# Patient Record
Sex: FEMALE | Race: WHITE | NOT HISPANIC OR LATINO | URBAN - METROPOLITAN AREA
[De-identification: names, ages, dates, MRNs, and addresses within clinical notes are randomized per-mention and may not be internally consistent; named-entity substitution may affect disease eponyms.]

---

## 2018-05-30 ENCOUNTER — OUTPATIENT (OUTPATIENT)
Dept: OUTPATIENT SERVICES | Facility: HOSPITAL | Age: 38
LOS: 1 days | End: 2018-05-30

## 2018-05-30 DIAGNOSIS — Z00.00 ENCOUNTER FOR GENERAL ADULT MEDICAL EXAMINATION WITHOUT ABNORMAL FINDINGS: ICD-10-CM

## 2024-03-05 ENCOUNTER — APPOINTMENT (OUTPATIENT)
Dept: OTOLARYNGOLOGY | Facility: CLINIC | Age: 44
End: 2024-03-05
Payer: COMMERCIAL

## 2024-03-05 DIAGNOSIS — Z87.09 PERSONAL HISTORY OF OTHER DISEASES OF THE RESPIRATORY SYSTEM: ICD-10-CM

## 2024-03-05 DIAGNOSIS — Z78.9 OTHER SPECIFIED HEALTH STATUS: ICD-10-CM

## 2024-03-05 DIAGNOSIS — Z86.39 PERSONAL HISTORY OF OTHER ENDOCRINE, NUTRITIONAL AND METABOLIC DISEASE: ICD-10-CM

## 2024-03-05 DIAGNOSIS — Z85.3 PERSONAL HISTORY OF MALIGNANT NEOPLASM OF BREAST: ICD-10-CM

## 2024-03-05 DIAGNOSIS — Z82.49 FAMILY HISTORY OF ISCHEMIC HEART DISEASE AND OTHER DISEASES OF THE CIRCULATORY SYSTEM: ICD-10-CM

## 2024-03-05 DIAGNOSIS — Z80.3 FAMILY HISTORY OF MALIGNANT NEOPLASM OF BREAST: ICD-10-CM

## 2024-03-05 PROBLEM — Z00.00 ENCOUNTER FOR PREVENTIVE HEALTH EXAMINATION: Status: ACTIVE | Noted: 2024-03-05

## 2024-03-05 PROCEDURE — 31231 NASAL ENDOSCOPY DX: CPT

## 2024-03-05 PROCEDURE — 99203 OFFICE O/P NEW LOW 30 MIN: CPT | Mod: 25

## 2024-03-05 RX ORDER — LIOTHYRONINE SODIUM 5 UG/1
TABLET ORAL
Refills: 0 | Status: ACTIVE | COMMUNITY

## 2024-03-05 RX ORDER — AMOXICILLIN AND CLAVULANATE POTASSIUM 562.5; 437.5; 62.5 MG/1; MG/1; MG/1
TABLET, FILM COATED, EXTENDED RELEASE ORAL
Refills: 0 | Status: ACTIVE | COMMUNITY

## 2024-03-05 RX ORDER — LEVOTHYROXINE SODIUM 137 UG/1
TABLET ORAL
Refills: 0 | Status: ACTIVE | COMMUNITY

## 2024-03-05 RX ORDER — PREDNISONE 10 MG/1
10 TABLET ORAL
Qty: 12 | Refills: 0 | Status: ACTIVE | COMMUNITY
Start: 2024-03-05 | End: 1900-01-01

## 2024-03-05 NOTE — ASSESSMENT
[FreeTextEntry1] : OSCAR FRIAS has CRS with severe congestion. I started her on prednisone 30mg taper. ,tw  She will see Dr. Tyson Mantilla next week.

## 2024-03-05 NOTE — HISTORY OF PRESENT ILLNESS
[de-identified] : OSCAR FRIAS  is a 44 year woman with a history of Usher type II. She ahs visual loss and uses hearing aids. She has long history of sinus problems. She had septoplasty with Dr. Curry in 1999. After COVID in 2022 her sinus symptoms became severe mostly with nasal obstruction. She has had 3 procedures with Dr. Hayde Beard. 2 sinus procedures and one Vivier in office. She gets severe nasal congestion and facial pressure. She recently completed Augmentin for acute infection.  Recent CT shows pan sinusitis.

## 2024-03-05 NOTE — PHYSICAL EXAM
[TextEntry] : PHYSICAL EXAM  General: The patient was alert and oriented and in no distress. Voice was normal.  Neurologic: Cranial Nerves II-XII intact PERRLA There was no significant nystagmus or disconjugate gaze noted.  EOM's: Normal  Face: The patient had no facial asymmetry or mass. The skin was unremarkable.  Ears: External ears were normal without deformity. Ear canals were normal. Tympanic membranes were intact and normal. No perforation or effusion  Nose:  The external nose had no significant deformity.  There was no facial tenderness.  On anterior rhinoscopy, the nasal mucosa was normal.  The anterior septum was normal. There were no visualized polyps purulence  or masses.  Oral cavity: The oral mucosa was normal. The oral and base of tongue were clear and without mass. The gingival and buccal mucosa were moist and without lesions. The palate moved well. There was no cleft palate. There appeared to be good salivary flow.   There was no pus, erythema or mass in the oral cavity/oropharynx.  Neck:  The neck was symmetrical. The parotid and submandibular glands were normal without masses. The trachea was midline and there was no unusual crepitus. Thyroid was smooth and nontender and no masses were palpated. Cervical adenopathy- none.  Procedure: Fiberoptic Nasal Endoscopy  Diagnosis: Chronic sinusitis; s/p sinus surgery Examiner: Dr. Jose David Mccauley Anesthesia: Topical Lidocaine 2% and oxymetazoline  Procedure: The fiberoptic endoscope was introduced into the right nostril and passed along the floor of the nose and then  along the middle meatus. The same procedure was carried out  on the left side.  Findings:  SEPTUM:    RIGHT:  s/p sinus surgery Middle Turbinate: normal Frontal Sinus/Recess:clear Ethmoid Sinus cavity:clear Maxillary Sinus antrostomy:very small with discharge Interior of maxillary of sinus:normal Spheno ethmoidal recess:clear Inferior turbinate normal  LEFT: s/p surgery Middle Turbinate: normal Frontal Sinus/Recess:clear Ethmoid Sinus cavity:swollen Maxillary Sinus antrostomy:not seen Interior of maxillary of sinus:normal Spheno ethmoidal recess:clear Inferior turbinate normal  I cultured  left middle meatus  I suctioned thick mucin from left side

## 2024-03-12 ENCOUNTER — APPOINTMENT (OUTPATIENT)
Dept: OTOLARYNGOLOGY | Facility: CLINIC | Age: 44
End: 2024-03-12
Payer: COMMERCIAL

## 2024-03-12 PROCEDURE — 99214 OFFICE O/P EST MOD 30 MIN: CPT | Mod: 25

## 2024-03-12 PROCEDURE — 31231 NASAL ENDOSCOPY DX: CPT

## 2024-03-12 RX ORDER — MOMETASONE FUROATE 100 %
POWDER (GRAM) MISCELLANEOUS
Qty: 360 | Refills: 3 | Status: ACTIVE | COMMUNITY
Start: 2024-03-12 | End: 1900-01-01

## 2024-03-13 NOTE — HISTORY OF PRESENT ILLNESS
[de-identified] : CC: Usher syndrome   HISTORY OF PRESENT ILLNESS: Eduardo Coppola is a 43 y/o female who presents today with Usher syndrome Patient is referred by Dr. Mccauley, an Otolaryngologist, due to the complexity of the case has had at least 2 sinonasal procedures, most recently by Dr. 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 only line of sight vision  she had a CT sinus at [facility] in 2/2024 showing pansinusitis, right DNS, undissected anterior ethmoid cells, narrow frontals  Environmental Allergies: tested, negative Immunotherapy:no Smoker:no Asthma: no ASA sensitivity: no History of Autoimmune Disease: yes with Usher History of Immune Deficiency: no   Key Elements at least 4 described: Location: bilateral Severity: severe Quality: hyposmia Timing: constant Duration: years Modifying Factors: pred/abx Associated signs and symptoms: see above   REVIEW OF SYSTEMS: General ROS: negative for - chills, fatigue or fever Psychological ROS: negative for - anxiety or depression Ophthalmic ROS: negative for - blurry vision, decreased vision or double vision ENT ROS: negative except as noted from HPI Allergy and Immunology ROS: negative except as noted from HPI Hematological and Lymphatic ROS: negative for - bleeding problems Endocrine ROS: negative for - malaise/lethargy Respiratory ROS: negative for - stridor Cardiovascular ROS: negative for - chest pain Gastrointestinal ROS: negative for - appetite loss or nausea/vomiting Genitourinary ROS: negative for - incontinence Musculoskeletal ROS: negative for - gait disturbance Neurological ROS: negative for - behavioral changes Dermatological ROS: negative for - nail changes   Physical Exam:   GENERAL APPEARANCE: Well-developed and No Acute Distress. COMMUNICATION: Able to Communicate. Strong Voice.   HEAD AND FACE Eyes: Testing of ocular motility including primary gaze alignment normal. Inspection and Appearance: No evidence of lesions or masses Palpation: Palpation of the face reveals no sinus tenderness Salivary Glands: Symmetric without masses Facial Strength: Symmetric without evidence of facial paralysis   EAR, NOSE, MOUTH, and THROAT: Ear Canals and Tympanic Membranes, Bilateral: No evidence of inflammation or lesions. Thresholds: Clinical speech reception thresholds normal. External, Nose and Auricle: No lesions or masses. Nasal, Mucosa, Septum, and Turbinates: See endoscopy.   NECK: Evaluation: No evidence of masses or crepitus. The neck is symmetric and the trachea is in the midline position. Thyroid: No evidence of enlargement, tenderness or mass. Neck Lymph Nodes: WNL. Respiratory: Inspection of the chest including symmetry, expansion and/or assessment of respiratory effort normal. Cardiovascular: Evaluation of peripheral vascular system by observation and palpation of capillary refill, normal. Neurological/Psychiatric: Alert, Oriented, Mood, and Affect Normal.   IMAGING:   PROCEDURE: Nasal endoscopy ([id number])   SURGEON: Tyson Mantilla MD   Prior to the procedure, I had a discussion with the patient regarding the risks, benefits, and alternatives of the procedure and a verbal consent was obtained.   After obtaining adequate decongestion of the nasal mucosa with topical Epinephrine and adequate anesthesia with topical Lidocaine nasal spray, the nasal endoscope was used to examine the nasal passages and paranasal sinuses. The endoscope was passed along the floor of the nose bilaterally to evaluate the inferior meatus, floor of the nose, inferior turbinate, and nasopharynx. The scope was then passed superiorly to evaluate the area of the sphenoethmoidal recess, superior turbinate and superior meatus. As the scope was withdrawn anteriorly, the middle turbinate and middle meatus were carefully inspected. The endoscope was withdrawn and the patient tolerated the procedure well. No complications were encountered.   INSTRUMENTS: rigid 45   EXAM FINDINGS: right septal deformity. severe bilateral edema. right maxillary antrostomy is almost completely scarred off. left side has copious thick mucus suctioned out. max is closed.   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

## 2024-03-18 LAB — EAR NOSE AND THROAT CULTURE: NORMAL

## 2024-03-19 RX ORDER — PREDNISONE 10 MG/1
10 TABLET ORAL
Qty: 60 | Refills: 0 | Status: ACTIVE | COMMUNITY
Start: 2024-03-18 | End: 1900-01-01

## 2024-03-19 RX ORDER — OXYCODONE AND ACETAMINOPHEN 5; 325 MG/1; MG/1
5-325 TABLET ORAL
Qty: 30 | Refills: 0 | Status: ACTIVE | COMMUNITY
Start: 2024-03-18 | End: 1900-01-01

## 2024-03-19 RX ORDER — ELASTIC BANDAGE 1"X2.2YD
2300-700 BANDAGE TOPICAL
Qty: 1 | Refills: 0 | Status: DISCONTINUED | COMMUNITY
Start: 2024-03-18 | End: 2024-03-19

## 2024-03-19 RX ORDER — CLINDAMYCIN HYDROCHLORIDE 150 MG/1
150 CAPSULE ORAL
Qty: 42 | Refills: 0 | Status: ACTIVE | COMMUNITY
Start: 2024-03-18 | End: 1900-01-01

## 2024-03-22 VITALS
DIASTOLIC BLOOD PRESSURE: 59 MMHG | TEMPERATURE: 97 F | OXYGEN SATURATION: 97 % | RESPIRATION RATE: 16 BRPM | HEART RATE: 73 BPM | WEIGHT: 141.54 LBS | HEIGHT: 66 IN | SYSTOLIC BLOOD PRESSURE: 91 MMHG

## 2024-03-22 NOTE — ASU PATIENT PROFILE, ADULT - NSICDXPASTMEDICALHX_GEN_ALL_CORE_FT
PAST MEDICAL HISTORY:  Usher syndrome Legally BLIND     PAST MEDICAL HISTORY:  Anxiety     GERD (gastroesophageal reflux disease)     H/O hearing loss     History of retinitis pigmentosa     Usher syndrome Legally BLIND     PAST MEDICAL HISTORY:  Anxiety     Deviated nasal septum     GERD (gastroesophageal reflux disease)     H/O hearing loss     History of retinitis pigmentosa     Usher syndrome Legally BLIND

## 2024-03-22 NOTE — ASU PATIENT PROFILE, ADULT - VISION (WITH CORRECTIVE LENSES IF THE PATIENT USUALLY WEARS THEM):
Legally BLIND/Severely impaired: cannot locate objects without hearing or touching them or patient nonresponsive.

## 2024-03-22 NOTE — ASU PATIENT PROFILE, ADULT - NSICDXPASTSURGICALHX_GEN_ALL_CORE_FT
PAST SURGICAL HISTORY:  Breast CA     H/O right mastectomy reconstruction    H/O sinus surgery x 2    H/O thyroidectomy     H/O total hip arthroplasty, left     History of tonsillectomy and adenoidectomy

## 2024-03-22 NOTE — ASU PATIENT PROFILE, ADULT - ABILITY TO HEAR (WITH HEARING AID OR HEARING APPLIANCE IF NORMALLY USED):
Adequate: hears normal conversation without difficulty BILAT HEARING AIDS/Adequate: hears normal conversation without difficulty

## 2024-03-22 NOTE — ASU PATIENT PROFILE, ADULT - FALL HARM RISK - RISK INTERVENTIONS

## 2024-03-24 ENCOUNTER — TRANSCRIPTION ENCOUNTER (OUTPATIENT)
Age: 44
End: 2024-03-24

## 2024-03-25 ENCOUNTER — TRANSCRIPTION ENCOUNTER (OUTPATIENT)
Age: 44
End: 2024-03-25

## 2024-03-25 ENCOUNTER — APPOINTMENT (OUTPATIENT)
Dept: OTOLARYNGOLOGY | Facility: HOSPITAL | Age: 44
End: 2024-03-25

## 2024-03-25 ENCOUNTER — OUTPATIENT (OUTPATIENT)
Dept: OUTPATIENT SERVICES | Facility: HOSPITAL | Age: 44
LOS: 1 days | Discharge: HOME | End: 2024-03-25
Payer: COMMERCIAL

## 2024-03-25 ENCOUNTER — RESULT REVIEW (OUTPATIENT)
Age: 44
End: 2024-03-25

## 2024-03-25 VITALS
DIASTOLIC BLOOD PRESSURE: 65 MMHG | TEMPERATURE: 99 F | HEART RATE: 69 BPM | RESPIRATION RATE: 14 BRPM | SYSTOLIC BLOOD PRESSURE: 112 MMHG | OXYGEN SATURATION: 100 %

## 2024-03-25 DIAGNOSIS — Z90.11 ACQUIRED ABSENCE OF RIGHT BREAST AND NIPPLE: Chronic | ICD-10-CM

## 2024-03-25 DIAGNOSIS — Z98.890 OTHER SPECIFIED POSTPROCEDURAL STATES: Chronic | ICD-10-CM

## 2024-03-25 DIAGNOSIS — C50.919 MALIGNANT NEOPLASM OF UNSPECIFIED SITE OF UNSPECIFIED FEMALE BREAST: Chronic | ICD-10-CM

## 2024-03-25 DIAGNOSIS — Z90.89 ACQUIRED ABSENCE OF OTHER ORGANS: Chronic | ICD-10-CM

## 2024-03-25 DIAGNOSIS — E89.0 POSTPROCEDURAL HYPOTHYROIDISM: Chronic | ICD-10-CM

## 2024-03-25 DIAGNOSIS — Z96.642 PRESENCE OF LEFT ARTIFICIAL HIP JOINT: Chronic | ICD-10-CM

## 2024-03-25 PROCEDURE — 61782 SCAN PROC CRANIAL EXTRA: CPT

## 2024-03-25 PROCEDURE — 88305 TISSUE EXAM BY PATHOLOGIST: CPT

## 2024-03-25 PROCEDURE — 88311 DECALCIFY TISSUE: CPT | Mod: 26

## 2024-03-25 PROCEDURE — 31267 ENDOSCOPY MAXILLARY SINUS: CPT | Mod: 50

## 2024-03-25 PROCEDURE — 88311 DECALCIFY TISSUE: CPT

## 2024-03-25 PROCEDURE — 30520 REPAIR OF NASAL SEPTUM: CPT

## 2024-03-25 PROCEDURE — C1889: CPT

## 2024-03-25 PROCEDURE — 31253 NSL/SINS NDSC TOTAL: CPT | Mod: 50

## 2024-03-25 PROCEDURE — 31276 NSL/SINS NDSC FRNT TISS RMVL: CPT | Mod: 50

## 2024-03-25 PROCEDURE — 30930 THER FX NASAL INF TURBINATE: CPT

## 2024-03-25 PROCEDURE — C2625: CPT

## 2024-03-25 PROCEDURE — 31259 NSL/SINS NDSC SPHN TISS RMVL: CPT | Mod: 50

## 2024-03-25 PROCEDURE — 88305 TISSUE EXAM BY PATHOLOGIST: CPT | Mod: 26

## 2024-03-25 DEVICE — STENT DRUG ELUTING SINUS INTERSECT ENT PROPEL MINI 16MM
Type: IMPLANTABLE DEVICE | Status: NON-FUNCTIONAL
Removed: 2024-03-25

## 2024-03-25 DEVICE — SURGIFLO HEMOSTATIC MATRIX KIT
Type: IMPLANTABLE DEVICE | Status: NON-FUNCTIONAL
Removed: 2024-03-25

## 2024-03-25 RX ORDER — LANOLIN ALCOHOL/MO/W.PET/CERES
1 CREAM (GRAM) TOPICAL
Refills: 0 | DISCHARGE

## 2024-03-25 RX ORDER — ONDANSETRON 4 MG/1
4 TABLET, ORALLY DISINTEGRATING ORAL 3 TIMES DAILY
Qty: 15 | Refills: 0 | Status: ACTIVE | COMMUNITY
Start: 2024-03-25 | End: 1900-01-01

## 2024-03-25 RX ORDER — ACETAMINOPHEN 500 MG
1000 TABLET ORAL ONCE
Refills: 0 | Status: COMPLETED | OUTPATIENT
Start: 2024-03-25 | End: 2024-03-25

## 2024-03-25 RX ORDER — RACEPINEPHRINE HCL 2.25 %
1 SOLUTION, NON-ORAL TOPICAL ONCE
Refills: 0 | Status: DISCONTINUED | OUTPATIENT
Start: 2024-03-25 | End: 2024-03-25

## 2024-03-25 RX ORDER — ACETAMINOPHEN 500 MG
650 TABLET ORAL ONCE
Refills: 0 | Status: DISCONTINUED | OUTPATIENT
Start: 2024-03-25 | End: 2024-03-25

## 2024-03-25 RX ORDER — ONDANSETRON 8 MG/1
4 TABLET, FILM COATED ORAL ONCE
Refills: 0 | Status: COMPLETED | OUTPATIENT
Start: 2024-03-25 | End: 2024-03-25

## 2024-03-25 RX ORDER — APREPITANT 80 MG/1
40 CAPSULE ORAL ONCE
Refills: 0 | Status: COMPLETED | OUTPATIENT
Start: 2024-03-25 | End: 2024-03-25

## 2024-03-25 RX ORDER — HYDROMORPHONE HYDROCHLORIDE 2 MG/ML
0.5 INJECTION INTRAMUSCULAR; INTRAVENOUS; SUBCUTANEOUS ONCE
Refills: 0 | Status: DISCONTINUED | OUTPATIENT
Start: 2024-03-25 | End: 2024-03-25

## 2024-03-25 RX ORDER — SCOPALAMINE 1 MG/3D
1 PATCH, EXTENDED RELEASE TRANSDERMAL ONCE
Refills: 0 | Status: COMPLETED | OUTPATIENT
Start: 2024-03-25 | End: 2024-03-25

## 2024-03-25 RX ORDER — LEVOTHYROXINE SODIUM 125 MCG
1 TABLET ORAL
Refills: 0 | DISCHARGE

## 2024-03-25 RX ORDER — LIOTHYRONINE SODIUM 25 UG/1
1 TABLET ORAL
Refills: 0 | DISCHARGE

## 2024-03-25 RX ADMIN — ONDANSETRON 4 MILLIGRAM(S): 8 TABLET, FILM COATED ORAL at 13:56

## 2024-03-25 RX ADMIN — HYDROMORPHONE HYDROCHLORIDE 0.5 MILLIGRAM(S): 2 INJECTION INTRAMUSCULAR; INTRAVENOUS; SUBCUTANEOUS at 14:22

## 2024-03-25 RX ADMIN — Medication 1000 MILLIGRAM(S): at 14:52

## 2024-03-25 RX ADMIN — HYDROMORPHONE HYDROCHLORIDE 0.5 MILLIGRAM(S): 2 INJECTION INTRAMUSCULAR; INTRAVENOUS; SUBCUTANEOUS at 13:54

## 2024-03-25 RX ADMIN — Medication 400 MILLIGRAM(S): at 14:22

## 2024-03-25 RX ADMIN — APREPITANT 40 MILLIGRAM(S): 80 CAPSULE ORAL at 08:59

## 2024-03-25 RX ADMIN — SCOPALAMINE 1 PATCH: 1 PATCH, EXTENDED RELEASE TRANSDERMAL at 09:00

## 2024-03-25 NOTE — BRIEF OPERATIVE NOTE - NSICDXBRIEFPROCEDURE_GEN_ALL_CORE_FT
PROCEDURES:  Functional endoscopic sinus surgery with septoplasty 25-Mar-2024 12:59:29  Jeison Martini

## 2024-03-25 NOTE — BRIEF OPERATIVE NOTE - NSICDXBRIEFPREOP_GEN_ALL_CORE_FT
PRE-OP DIAGNOSIS:  Chronic rhinosinusitis 25-Mar-2024 12:58:39  Jeison Martini  Deviated nasal septum 25-Mar-2024 12:58:50  Jeison Martini

## 2024-03-25 NOTE — ASU DISCHARGE PLAN (ADULT/PEDIATRIC) - ASU DC SPECIAL INSTRUCTIONSFT
- see post op instructions sheet  - follow-up as instructed  - take medications as prescribed  - sinus precautions

## 2024-03-25 NOTE — BRIEF OPERATIVE NOTE - NSICDXBRIEFPOSTOP_GEN_ALL_CORE_FT
POST-OP DIAGNOSIS:  Chronic rhinosinusitis 25-Mar-2024 12:58:59  Jeison Martini  Deviated nasal septum 25-Mar-2024 12:59:15  Jeison Martini

## 2024-03-28 LAB
EAR NOSE AND THROAT CULTURE: NORMAL
SURGICAL PATHOLOGY STUDY: SIGNIFICANT CHANGE UP

## 2024-04-01 ENCOUNTER — APPOINTMENT (OUTPATIENT)
Dept: OTOLARYNGOLOGY | Facility: CLINIC | Age: 44
End: 2024-04-01
Payer: COMMERCIAL

## 2024-04-01 DIAGNOSIS — R09.81 NASAL CONGESTION: ICD-10-CM

## 2024-04-01 PROBLEM — K21.9 GASTRO-ESOPHAGEAL REFLUX DISEASE WITHOUT ESOPHAGITIS: Chronic | Status: ACTIVE | Noted: 2024-03-25

## 2024-04-01 PROBLEM — Z86.69 PERSONAL HISTORY OF OTHER DISEASES OF THE NERVOUS SYSTEM AND SENSE ORGANS: Chronic | Status: ACTIVE | Noted: 2024-03-25

## 2024-04-01 PROBLEM — H35.52 PIGMENTARY RETINAL DYSTROPHY: Chronic | Status: ACTIVE | Noted: 2024-03-22

## 2024-04-01 PROBLEM — J34.2 DEVIATED NASAL SEPTUM: Chronic | Status: ACTIVE | Noted: 2024-03-25

## 2024-04-01 PROBLEM — F41.9 ANXIETY DISORDER, UNSPECIFIED: Chronic | Status: ACTIVE | Noted: 2024-03-25

## 2024-04-01 PROCEDURE — 31237 NSL/SINS NDSC SURG BX POLYPC: CPT | Mod: 50,79

## 2024-04-01 NOTE — PROCEDURE
[FreeTextEntry3] : HISTORY OF PRESENT ILLNESS   Eduardo Coppola is s/p revision septoplasty, bFESS on 03/25/2024. Currently on clinda/pred/NSI with mometasone. Complaints: congestion, PND she has found that warm compresses has helped with facial pain no growth to date on cultures   Physical Exam General: AAO x 3, WDWN  Ears: Canals clear, TM;s nrml  Nose: See endoscopy  Throat: uvula midline. No masses or lesions  Eyes: PERRL, EOMI  Neuro: Gross nrml, CN 2-12 intact  Resp: Nrml chest excursion  Skin: Appears intact   Procedure Note   PROCEDURE: Nasal/sinus endoscopy, surgical, with debridement (86220-15-08)   INDICATION: CRSsNP, DNS   Prior to the procedure, I had a discussion with the patient regarding the risks, benefits, and alternatives of the debridement and they signed a consent.   SURGEON: Dr. Tyson Mantilla   PROCEDURE DETAIL: After obtaining adequate decongestion of the nasal mucosa with ephedrine nasal spray, and adequate anesthesia with 2% topical Lidocaine nasal spray, the nasal endoscope was used to examine the nasal passages and paranasal sinuses. Using a combination of suction, grasping instruments and swabs, the maxillary, ethmoid, frontal and sphenoid sinuses were debrided bilaterally of pus, crust, debris, clots and polyps to prevent scar formation, continued sinusitis and mucocele formation. At the end of this procedure, all operated sinuses were patent. The endoscope was withdrawn, and the patient tolerated the procedure well. No complications were encountered.   INSTRUMENTS: rigid 45   ENDOSCOPIC FINDINGS: fingercots removed. extensive debris removed bilaterally. propels in place. all operated sinuses widely patent   Impression: Eduardo Coppola is s/p revision septoplasty, bFESS on 03/25/2024. overall doing well   Plan:  - continue NSI with mometasone - complete course of abx - continue prednisone 20mg for 1 week - following cultures - RTC in 1 week   The patient was given an opportunity to ask questions, and all questions asked were answered to the best of my ability.    Tyson Mantilla MD [facility] Rhinology and Skull Base Surgery Department of Otolaryngology- Head and Neck Surgery [facility]

## 2024-04-08 ENCOUNTER — APPOINTMENT (OUTPATIENT)
Dept: OTOLARYNGOLOGY | Facility: CLINIC | Age: 44
End: 2024-04-08
Payer: COMMERCIAL

## 2024-04-08 DIAGNOSIS — H90.3 PIGMENTARY RETINAL DYSTROPHY: ICD-10-CM

## 2024-04-08 DIAGNOSIS — H35.52 PIGMENTARY RETINAL DYSTROPHY: ICD-10-CM

## 2024-04-08 PROCEDURE — 31237 NSL/SINS NDSC SURG BX POLYPC: CPT | Mod: 50,79

## 2024-04-08 NOTE — PROCEDURE
[FreeTextEntry3] : HISTORY OF PRESENT ILLNESS   Eduardo Coppola is s/p revision septoplasty, bFESS on 03/25/2024. Currently on pred20/NSI with mometasone. Complaints: congestion, PND. R side worse than L. improving smell. feels great   Physical Exam General: AAO x 3, WDWN  Ears: Canals clear, TM;s nrml  Nose: See endoscopy  Throat: uvula midline. No masses or lesions  Eyes: PERRL, EOMI  Neuro: Gross nrml, CN 2-12 intact  Resp: Nrml chest excursion  Skin: Appears intact   Procedure Note   PROCEDURE: Nasal/sinus endoscopy, surgical, with debridement ([id number])   INDICATION: CRSsNP, DNS   Prior to the procedure, I had a discussion with the patient regarding the risks, benefits, and alternatives of the debridement and they signed a consent.   SURGEON: Dr. Tyson Mantilla   PROCEDURE DETAIL: After obtaining adequate decongestion of the nasal mucosa with ephedrine nasal spray, and adequate anesthesia with 2% topical Lidocaine nasal spray, the nasal endoscope was used to examine the nasal passages and paranasal sinuses. Using a combination of suction, grasping instruments and swabs, the maxillary, ethmoid, frontal and sphenoid sinuses were debrided bilaterally of pus, crust, debris, clots and polyps to prevent scar formation, continued sinusitis and mucocele formation. At the end of this procedure, all operated sinuses were patent. The endoscope was withdrawn, and the patient tolerated the procedure well. No complications were encountered.   INSTRUMENTS: rigid 45   ENDOSCOPIC FINDINGS: propel minis removed. improved debris removed. all operated sinuses widely patent   Impression: Eduardo Coppola is s/p revision septoplasty, bFESS on 03/25/2024. overall doing well   Plan:  - mometasone BID - continue prednisone 10mg for 1 week and 5mg for 1 week then off - RTC in 2 weeks   The patient was given an opportunity to ask questions, and all questions asked were answered to the best of my ability.    Tyson Mantilla MD [facility] Rhinology and Skull Base Surgery Department of Otolaryngology- Head and Neck Surgery [facility]

## 2024-04-23 ENCOUNTER — APPOINTMENT (OUTPATIENT)
Dept: OTOLARYNGOLOGY | Facility: CLINIC | Age: 44
End: 2024-04-23
Payer: COMMERCIAL

## 2024-04-23 DIAGNOSIS — J32.2 CHRONIC ETHMOIDAL SINUSITIS: ICD-10-CM

## 2024-04-23 DIAGNOSIS — J32.1 CHRONIC FRONTAL SINUSITIS: ICD-10-CM

## 2024-04-23 DIAGNOSIS — J32.0 CHRONIC MAXILLARY SINUSITIS: ICD-10-CM

## 2024-04-23 DIAGNOSIS — J32.3 CHRONIC SPHENOIDAL SINUSITIS: ICD-10-CM

## 2024-04-23 DIAGNOSIS — J34.2 DEVIATED NASAL SEPTUM: ICD-10-CM

## 2024-04-23 PROCEDURE — 31237 NSL/SINS NDSC SURG BX POLYPC: CPT | Mod: 50,79

## 2024-04-23 NOTE — PROCEDURE
[FreeTextEntry3] : HISTORY OF PRESENT ILLNESS   Eduardo Coppola is s/p revision septoplasty, bFESS on 03/25/2024. Currently on mometasone. Complaints: tapered off the pred. retinal exam was good. doing well.   Physical Exam General: AAO x 3, WDWN  Ears: Canals clear, TM;s nrml  Nose: See endoscopy  Throat: uvula midline. No masses or lesions  Eyes: PERRL, EOMI  Neuro: Gross nrml, CN 2-12 intact  Resp: Nrml chest excursion  Skin: Appears intact   Procedure Note   PROCEDURE: Nasal/sinus endoscopy, surgical, with debridement ([id number])   INDICATION: CRSsNP, DNS   Prior to the procedure, I had a discussion with the patient regarding the risks, benefits, and alternatives of the debridement and they signed a consent.   SURGEON: Dr. Tyson Mantilla   PROCEDURE DETAIL: After obtaining adequate decongestion of the nasal mucosa with ephedrine nasal spray, and adequate anesthesia with 2% topical Lidocaine nasal spray, the nasal endoscope was used to examine the nasal passages and paranasal sinuses. Using a combination of suction, grasping instruments and swabs, the maxillary, ethmoid, frontal and sphenoid sinuses were debrided bilaterally of pus, crust, debris, clots and polyps to prevent scar formation, continued sinusitis and mucocele formation. At the end of this procedure, all operated sinuses were patent. The endoscope was withdrawn, and the patient tolerated the procedure well. No complications were encountered.   INSTRUMENTS: rigid 45   ENDOSCOPIC FINDINGS: scant diffuse debris removed. all operated sinuses widely patent   Impression: Eduardo Coppola is s/p revision septoplasty, bFESS on 03/25/2024. overall doing well   Plan:  - mometasone BID - RTC 3 months   The patient was given an opportunity to ask questions, and all questions asked were answered to the best of my ability.    Tyson Mantilla MD [facility] Rhinology and Skull Base Surgery Department of Otolaryngology- Head and Neck Surgery [facility]

## 2024-08-12 ENCOUNTER — APPOINTMENT (OUTPATIENT)
Dept: OTOLARYNGOLOGY | Facility: CLINIC | Age: 44
End: 2024-08-12
Payer: COMMERCIAL

## 2024-08-12 DIAGNOSIS — J32.3 CHRONIC SPHENOIDAL SINUSITIS: ICD-10-CM

## 2024-08-12 DIAGNOSIS — J32.1 CHRONIC FRONTAL SINUSITIS: ICD-10-CM

## 2024-08-12 DIAGNOSIS — J32.0 CHRONIC MAXILLARY SINUSITIS: ICD-10-CM

## 2024-08-12 DIAGNOSIS — J32.2 CHRONIC ETHMOIDAL SINUSITIS: ICD-10-CM

## 2024-08-12 DIAGNOSIS — J34.2 DEVIATED NASAL SEPTUM: ICD-10-CM

## 2024-08-12 PROCEDURE — 99213 OFFICE O/P EST LOW 20 MIN: CPT | Mod: 25

## 2024-08-12 PROCEDURE — 31231 NASAL ENDOSCOPY DX: CPT

## 2024-08-12 RX ORDER — ACETAZOLAMIDE 250 MG
TABLET ORAL
Refills: 0 | Status: ACTIVE | COMMUNITY

## 2024-08-12 NOTE — HISTORY OF PRESENT ILLNESS
[de-identified] : CC: Usher syndrome   HISTORY OF PRESENT ILLNESS: Eduardo Coppola is a 45 y/o female who presents today with Usher syndrome Patient is referred by Dr. Mccauley, an Otolaryngologist, due to the complexity of the case has had at least 2 sinonasal procedures, most recently by Dr. 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 only line of sight vision  she had a CT sinus at [facility] in 2/2024 showing pansinusitis, right DNS, undissected anterior ethmoid cells, narrow frontals  Environmental Allergies: tested, negative Immunotherapy:no Smoker:no Asthma: no ASA sensitivity: no History of Autoimmune Disease: yes with Usher History of Immune Deficiency: no   Key Elements at least 4 described: Location: bilateral Severity: severe Quality: hyposmia Timing: constant Duration: years Modifying Factors: pred/abx Associated signs and symptoms: see above   3 month follow up, s/p revision septoplasty, bFESS on 3/25/2024 on mometasone BID doing well, just moved and despite the dust she's feeling well on diamox PO and acetazolamide eye drops  REVIEW OF SYSTEMS: General ROS: negative for - chills, fatigue or fever Psychological ROS: negative for - anxiety or depression Ophthalmic ROS: negative for - blurry vision, decreased vision or double vision ENT ROS: negative except as noted from HPI Allergy and Immunology ROS: negative except as noted from HPI Hematological and Lymphatic ROS: negative for - bleeding problems Endocrine ROS: negative for - malaise/lethargy Respiratory ROS: negative for - stridor Cardiovascular ROS: negative for - chest pain Gastrointestinal ROS: negative for - appetite loss or nausea/vomiting Genitourinary ROS: negative for - incontinence Musculoskeletal ROS: negative for - gait disturbance Neurological ROS: negative for - behavioral changes Dermatological ROS: negative for - nail changes   Physical Exam:   GENERAL APPEARANCE: Well-developed and No Acute Distress. COMMUNICATION: Able to Communicate. Strong Voice.   HEAD AND FACE Eyes: Testing of ocular motility including primary gaze alignment normal. Inspection and Appearance: No evidence of lesions or masses Palpation: Palpation of the face reveals no sinus tenderness Salivary Glands: Symmetric without masses Facial Strength: Symmetric without evidence of facial paralysis   EAR, NOSE, MOUTH, and THROAT: Ear Canals and Tympanic Membranes, Bilateral: No evidence of inflammation or lesions. Thresholds: Clinical speech reception thresholds normal. External, Nose and Auricle: No lesions or masses. Nasal, Mucosa, Septum, and Turbinates: See endoscopy.   NECK: Evaluation: No evidence of masses or crepitus. The neck is symmetric and the trachea is in the midline position. Thyroid: No evidence of enlargement, tenderness or mass. Neck Lymph Nodes: WNL. Respiratory: Inspection of the chest including symmetry, expansion and/or assessment of respiratory effort normal. Cardiovascular: Evaluation of peripheral vascular system by observation and palpation of capillary refill, normal. Neurological/Psychiatric: Alert, Oriented, Mood, and Affect Normal.   IMAGING:   PROCEDURE: Nasal endoscopy ([id number])   SURGEON: Tyson Mantilla MD   Prior to the procedure, I had a discussion with the patient regarding the risks, benefits, and alternatives of the procedure and a verbal consent was obtained.   After obtaining adequate decongestion of the nasal mucosa with topical Epinephrine and adequate anesthesia with topical Lidocaine nasal spray, the nasal endoscope was used to examine the nasal passages and paranasal sinuses. The endoscope was passed along the floor of the nose bilaterally to evaluate the inferior meatus, floor of the nose, inferior turbinate, and nasopharynx. The scope was then passed superiorly to evaluate the area of the sphenoethmoidal recess, superior turbinate and superior meatus. As the scope was withdrawn anteriorly, the middle turbinate and middle meatus were carefully inspected. The endoscope was withdrawn and the patient tolerated the procedure well. No complications were encountered.   INSTRUMENTS: rigid 45   EXAM FINDINGS: septum midline. 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

## 2024-08-12 NOTE — PROCEDURE
[FreeTextEntry3] : HISTORY OF PRESENT ILLNESS   Eduardo Coppola is s/p revision septoplasty, bFESS on 03/25/2024. Currently on mometasone. Complaints: tapered off the pred. retinal exam was good. doing well.   Physical Exam General: AAO x 3, WDWN  Ears: Canals clear, TM;s nrml  Nose: See endoscopy  Throat: uvula midline. No masses or lesions  Eyes: PERRL, EOMI  Neuro: Gross nrml, CN 2-12 intact  Resp: Nrml chest excursion  Skin: Appears intact   Procedure Note   PROCEDURE: Nasal/sinus endoscopy, surgical, with debridement ([id number])   INDICATION: CRSsNP, DNS   Prior to the procedure, I had a discussion with the patient regarding the risks, benefits, and alternatives of the debridement and they signed a consent.   SURGEON: Dr. Tyson Mantilla   PROCEDURE DETAIL: After obtaining adequate decongestion of the nasal mucosa with ephedrine nasal spray, and adequate anesthesia with 2% topical Lidocaine nasal spray, the nasal endoscope was used to examine the nasal passages and paranasal sinuses. Using a combination of suction, grasping instruments and swabs, the maxillary, ethmoid, frontal and sphenoid sinuses were debrided bilaterally of pus, crust, debris, clots and polyps to prevent scar formation, continued sinusitis and mucocele formation. At the end of this procedure, all operated sinuses were patent. The endoscope was withdrawn, and the patient tolerated the procedure well. No complications were encountered.   INSTRUMENTS: rigid 45   ENDOSCOPIC FINDINGS: scant diffuse debris removed. all operated sinuses widely patent   Impression: Eduardo Coppola is s/p revision septoplasty, bFESS on 03/25/2024. overall doing well   Plan:  - mometasone BID - RTC 3 months   The patient was given an opportunity to ask questions, and all questions asked were answered to the best of my ability.    Tyson Mantilla MD [facility] Rhinology and Skull Base Surgery Department of Otolaryngology- Head and Neck Surgery [facility]

## 2024-12-09 ENCOUNTER — APPOINTMENT (OUTPATIENT)
Dept: OTOLARYNGOLOGY | Facility: CLINIC | Age: 44
End: 2024-12-09
Payer: COMMERCIAL

## 2024-12-09 VITALS — BODY MASS INDEX: 20.89 KG/M2 | HEIGHT: 66 IN | WEIGHT: 130 LBS

## 2024-12-09 DIAGNOSIS — J32.2 CHRONIC ETHMOIDAL SINUSITIS: ICD-10-CM

## 2024-12-09 DIAGNOSIS — H35.52 PIGMENTARY RETINAL DYSTROPHY: ICD-10-CM

## 2024-12-09 DIAGNOSIS — J32.1 CHRONIC FRONTAL SINUSITIS: ICD-10-CM

## 2024-12-09 DIAGNOSIS — J32.3 CHRONIC SPHENOIDAL SINUSITIS: ICD-10-CM

## 2024-12-09 DIAGNOSIS — R09.81 NASAL CONGESTION: ICD-10-CM

## 2024-12-09 DIAGNOSIS — J32.0 CHRONIC MAXILLARY SINUSITIS: ICD-10-CM

## 2024-12-09 DIAGNOSIS — H90.3 PIGMENTARY RETINAL DYSTROPHY: ICD-10-CM

## 2024-12-09 DIAGNOSIS — J34.2 DEVIATED NASAL SEPTUM: ICD-10-CM

## 2024-12-09 PROCEDURE — 99213 OFFICE O/P EST LOW 20 MIN: CPT | Mod: 25

## 2024-12-09 PROCEDURE — 31231 NASAL ENDOSCOPY DX: CPT

## 2024-12-09 RX ORDER — ELECTROLYTES/DEXTROSE
SOLUTION, ORAL ORAL
Refills: 0 | Status: ACTIVE | COMMUNITY

## 2024-12-09 RX ORDER — DORZOLAMIDE HYDROCHLORIDE 20 MG/ML
SOLUTION OPHTHALMIC
Refills: 0 | Status: ACTIVE | COMMUNITY

## 2025-03-13 ENCOUNTER — APPOINTMENT (OUTPATIENT)
Dept: OTOLARYNGOLOGY | Facility: CLINIC | Age: 45
End: 2025-03-13
Payer: COMMERCIAL

## 2025-03-13 DIAGNOSIS — J34.2 DEVIATED NASAL SEPTUM: ICD-10-CM

## 2025-03-13 DIAGNOSIS — J32.3 CHRONIC SPHENOIDAL SINUSITIS: ICD-10-CM

## 2025-03-13 DIAGNOSIS — J32.0 CHRONIC MAXILLARY SINUSITIS: ICD-10-CM

## 2025-03-13 DIAGNOSIS — H35.52 PIGMENTARY RETINAL DYSTROPHY: ICD-10-CM

## 2025-03-13 DIAGNOSIS — H90.3 PIGMENTARY RETINAL DYSTROPHY: ICD-10-CM

## 2025-03-13 DIAGNOSIS — J32.2 CHRONIC ETHMOIDAL SINUSITIS: ICD-10-CM

## 2025-03-13 DIAGNOSIS — J32.1 CHRONIC FRONTAL SINUSITIS: ICD-10-CM

## 2025-03-13 DIAGNOSIS — R09.81 NASAL CONGESTION: ICD-10-CM

## 2025-03-13 PROCEDURE — 31231 NASAL ENDOSCOPY DX: CPT

## 2025-03-13 PROCEDURE — 99213 OFFICE O/P EST LOW 20 MIN: CPT | Mod: 25

## 2025-03-20 LAB — EAR NOSE AND THROAT CULTURE: ABNORMAL

## 2025-03-20 RX ORDER — AMOXICILLIN AND CLAVULANATE POTASSIUM 875; 125 MG/1; MG/1
875-125 TABLET, COATED ORAL
Qty: 20 | Refills: 0 | Status: ACTIVE | COMMUNITY
Start: 2025-03-20 | End: 1900-01-01

## (undated) DEVICE — BUR MEDTRONIC ENT HIGH SPEED CURVED DIAMOND 15 DEGREE 4.0MM X 15CM

## (undated) DEVICE — Device

## (undated) DEVICE — DRSG MEROCEL STANDARD W STRING 8CM

## (undated) DEVICE — SOL ANTI FOG

## (undated) DEVICE — SYR LUER LOK 30CC

## (undated) DEVICE — VENODYNE/SCD SLEEVE CALF MEDIUM

## (undated) DEVICE — BLADE MEDTRONIC ENT RAD 60 DEGREE ROTATABLE 4MM X 11CM

## (undated) DEVICE — GLV 7 PROTEXIS (WHITE)

## (undated) DEVICE — CLEANING SHEATH ENDO-SCRUB FOR STORZ 7230CA ARTHROSCOPE 4MM 70 DEGREE

## (undated) DEVICE — ELCTR COLORADO 3CM

## (undated) DEVICE — ELCTR GROUNDING PAD ADULT COVIDIEN

## (undated) DEVICE — DRSG NASOPORE 4CM FIRM

## (undated) DEVICE — TUBING SUCTION 20FT

## (undated) DEVICE — DRSG STERISTRIPS 0.5 X 4"

## (undated) DEVICE — ELCTR BOVIE SUCTION 8FR 6"

## (undated) DEVICE — SUT CHROMIC 4-0 27" SH

## (undated) DEVICE — BUR MEDTRONIC ENT HIGH SPEED TAPERED DIAMOND 70 DEGREE 4.0MM X 13CM

## (undated) DEVICE — SUT CHROMIC 4-0 18" PS-2

## (undated) DEVICE — BLADE MEDTRONIC ENT QUADCUT ROTATABLE STRAIGHT 4MM X 13CM

## (undated) DEVICE — ENDO SCRUB

## (undated) DEVICE — DRSG MEROCEL 2000 WITH STRING 8CM

## (undated) DEVICE — BUR MEDTRONIC ENT HIGH SPEED CURVED DIAMOND 70 DEGREE 5.0MM X 13CM

## (undated) DEVICE — WARMING BLANKET LOWER ADULT

## (undated) DEVICE — ELCTR BOVIE PENCIL SMOKE EVACUATION

## (undated) DEVICE — NDL ELECTRODE ULTRACLEAN 6

## (undated) DEVICE — SPECIMEN CONTAINER 4OZ

## (undated) DEVICE — PACK UPPER BODY

## (undated) DEVICE — CLEANING SHEATH ENDO-SCRUB FOR STORZ 7210AA TELESCOPE 4MM 0 DEGREE